# Patient Record
(demographics unavailable — no encounter records)

---

## 2025-01-31 NOTE — PLAN
[FreeTextEntry1] : Folliculitis   - Reassurance provided.   F/U in 1 month for annual examination.  All questions and concerns addressed during encounter. Pt. agreed to plan of care.

## 2025-01-31 NOTE — PHYSICAL EXAM
[Chaperone Present] : A chaperone was present in the examining room during all aspects of the physical examination [85808] : A chaperone was present during the pelvic exam. [FreeTextEntry1] : small folliculitis noted